# Patient Record
Sex: FEMALE | Race: WHITE
[De-identification: names, ages, dates, MRNs, and addresses within clinical notes are randomized per-mention and may not be internally consistent; named-entity substitution may affect disease eponyms.]

---

## 2018-12-03 LAB — INR PPP: 0.93

## 2018-12-03 NOTE — LEVENE H&P
DATE OF ADMISSION:  December 4, 2018 



IDENTIFICATION/CHIEF COMPLAINT

Neelima is a 73-year-old woman with a chief complaint of left knee pain.  



HISTORY OF PRESENT ILLNESS

Patient has a longstanding history of knee arthritis, progressively painful and 

debilitating, refractory to conservative care.  Surgery is indicated to relieve 

symptoms after failure of nonoperative measures. 



PAST MEDICAL HISTORY

Notable for history of:  

1.  DVT in the affected leg.  

2.  Sleep apnea.  

3.  Reactive airway disease.  

4.  Hypertension.  

5.  Diabetes.  

6.  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  

7.  Kidneys injured from a septic episode.  



PAST SURGICAL HISTORY

Notable for:  

1.  Hysterectomy.  

2.  Lumbar surgery.  



ALLERGIES

1.  PENICILLIN and SULFA that caused rash.  

2.  TETRACYCLINE.  

3.  CIPRO.  

4.  ALTACE.  

5.  LEVAQUIN.  



CURRENT MEDICATIONS

1.  Humalog and Lantus adjusted per sugar.  

2.  Imipramine 50 mg p.o. daily.  

3.  Triamcinolone ointment as directed.  

4.  Myrbetriq 50 mg p.o. daily.  

5.  Advair Diskus inhaler.  

6.  Metoprolol 25 mg p.o. daily.  

7.  Atorvastatin 40 mg p.o. daily.  

8.  Montelukast 10 mg p.o. daily.  

9.  Omeprazole 20 mg p.o. daily.



SOCIAL HISTORY

Negative for prior smoking; none presently.  Denies alcohol abuse.  



REVIEW OF SYSTEMS

Negative.



FAMILY HISTORY 

Unremarkable.  



PHYSICAL EXAMINATION

GENERAL:  This is healthy female.    

HEENT:  She is normocephalic, atraumatic.  Extraocular muscles intact.

NECK:  Supple.

LUNGS:  Clear.  

HEART:  Regular.  

ABDOMEN:  Soft.

ORTHOPEDIC:  The left knee has crepitus throughout.  She has an effusion 

present.  Gross stability is good.  Extensor function intact, stiff at end 

range.  



Radiographs demonstrate end-stage knee arthritis.  



ASSESSMENT

Left knee end-stage degenerative joint disease, refractory to conservative care.

 



PLAN 

Per patient request, will proceed with total knee arthroplasty.  Nature of the 

procedure, risks, benefits, and the anticipated rehab course reviewed.  Risks 

include but are not limited to death, major medical or anesthetic complications,

infection, neurovascular injury, blood transfusion, stiffness, scarring, 

fracture, tendon rupture, instability, implant loosening, migration or failure, 

persistent/recurrent pain, need for additional surgery, and other unforeseen.  

She understands and wishes to proceed.  Signed permit was placed in the chart.  

No guarantees were given or implied.  

MTDD

## 2018-12-04 ENCOUNTER — HOSPITAL ENCOUNTER (INPATIENT)
Dept: HOSPITAL 89 - OR | Age: 73
LOS: 2 days | Discharge: HOME HEALTH | DRG: 470 | End: 2018-12-06
Attending: ORTHOPAEDIC SURGERY | Admitting: ORTHOPAEDIC SURGERY
Payer: MEDICARE

## 2018-12-04 VITALS — SYSTOLIC BLOOD PRESSURE: 113 MMHG | DIASTOLIC BLOOD PRESSURE: 55 MMHG

## 2018-12-04 VITALS
BODY MASS INDEX: 43.24 KG/M2 | HEIGHT: 62.5 IN | WEIGHT: 241 LBS | HEIGHT: 62.5 IN | WEIGHT: 241 LBS | BODY MASS INDEX: 43.24 KG/M2

## 2018-12-04 VITALS — DIASTOLIC BLOOD PRESSURE: 63 MMHG | SYSTOLIC BLOOD PRESSURE: 111 MMHG

## 2018-12-04 VITALS — SYSTOLIC BLOOD PRESSURE: 100 MMHG | DIASTOLIC BLOOD PRESSURE: 69 MMHG

## 2018-12-04 VITALS — SYSTOLIC BLOOD PRESSURE: 116 MMHG | DIASTOLIC BLOOD PRESSURE: 58 MMHG

## 2018-12-04 VITALS — SYSTOLIC BLOOD PRESSURE: 117 MMHG | DIASTOLIC BLOOD PRESSURE: 62 MMHG

## 2018-12-04 VITALS — DIASTOLIC BLOOD PRESSURE: 67 MMHG | SYSTOLIC BLOOD PRESSURE: 124 MMHG

## 2018-12-04 VITALS — DIASTOLIC BLOOD PRESSURE: 62 MMHG | SYSTOLIC BLOOD PRESSURE: 123 MMHG

## 2018-12-04 VITALS — SYSTOLIC BLOOD PRESSURE: 116 MMHG | DIASTOLIC BLOOD PRESSURE: 64 MMHG

## 2018-12-04 VITALS — DIASTOLIC BLOOD PRESSURE: 48 MMHG | SYSTOLIC BLOOD PRESSURE: 58 MMHG

## 2018-12-04 VITALS — DIASTOLIC BLOOD PRESSURE: 55 MMHG | SYSTOLIC BLOOD PRESSURE: 113 MMHG

## 2018-12-04 VITALS — DIASTOLIC BLOOD PRESSURE: 61 MMHG | SYSTOLIC BLOOD PRESSURE: 115 MMHG

## 2018-12-04 VITALS — DIASTOLIC BLOOD PRESSURE: 54 MMHG | SYSTOLIC BLOOD PRESSURE: 130 MMHG

## 2018-12-04 VITALS — DIASTOLIC BLOOD PRESSURE: 53 MMHG | SYSTOLIC BLOOD PRESSURE: 105 MMHG

## 2018-12-04 VITALS — DIASTOLIC BLOOD PRESSURE: 92 MMHG | SYSTOLIC BLOOD PRESSURE: 100 MMHG

## 2018-12-04 VITALS — SYSTOLIC BLOOD PRESSURE: 150 MMHG | DIASTOLIC BLOOD PRESSURE: 92 MMHG

## 2018-12-04 DIAGNOSIS — K21.9: ICD-10-CM

## 2018-12-04 DIAGNOSIS — Z79.4: ICD-10-CM

## 2018-12-04 DIAGNOSIS — E87.5: ICD-10-CM

## 2018-12-04 DIAGNOSIS — E11.22: ICD-10-CM

## 2018-12-04 DIAGNOSIS — E78.5: ICD-10-CM

## 2018-12-04 DIAGNOSIS — I10: ICD-10-CM

## 2018-12-04 DIAGNOSIS — I12.9: ICD-10-CM

## 2018-12-04 DIAGNOSIS — G47.33: ICD-10-CM

## 2018-12-04 DIAGNOSIS — M17.12: Primary | ICD-10-CM

## 2018-12-04 DIAGNOSIS — Z88.1: ICD-10-CM

## 2018-12-04 DIAGNOSIS — Z88.0: ICD-10-CM

## 2018-12-04 DIAGNOSIS — N18.9: ICD-10-CM

## 2018-12-04 DIAGNOSIS — N39.0: ICD-10-CM

## 2018-12-04 DIAGNOSIS — Z88.2: ICD-10-CM

## 2018-12-04 DIAGNOSIS — E11.42: ICD-10-CM

## 2018-12-04 DIAGNOSIS — Z86.718: ICD-10-CM

## 2018-12-04 DIAGNOSIS — Z99.81: ICD-10-CM

## 2018-12-04 DIAGNOSIS — Z88.8: ICD-10-CM

## 2018-12-04 DIAGNOSIS — Z90.710: ICD-10-CM

## 2018-12-04 DIAGNOSIS — J45.909: ICD-10-CM

## 2018-12-04 PROCEDURE — 82947 ASSAY GLUCOSE BLOOD QUANT: CPT

## 2018-12-04 PROCEDURE — 84295 ASSAY OF SERUM SODIUM: CPT

## 2018-12-04 PROCEDURE — 82948 REAGENT STRIP/BLOOD GLUCOSE: CPT

## 2018-12-04 PROCEDURE — 82435 ASSAY OF BLOOD CHLORIDE: CPT

## 2018-12-04 PROCEDURE — 86850 RBC ANTIBODY SCREEN: CPT

## 2018-12-04 PROCEDURE — 86900 BLOOD TYPING SEROLOGIC ABO: CPT

## 2018-12-04 PROCEDURE — 76942 ECHO GUIDE FOR BIOPSY: CPT

## 2018-12-04 PROCEDURE — 36415 COLL VENOUS BLD VENIPUNCTURE: CPT

## 2018-12-04 PROCEDURE — 85610 PROTHROMBIN TIME: CPT

## 2018-12-04 PROCEDURE — 84520 ASSAY OF UREA NITROGEN: CPT

## 2018-12-04 PROCEDURE — 86901 BLOOD TYPING SEROLOGIC RH(D): CPT

## 2018-12-04 PROCEDURE — 36416 COLLJ CAPILLARY BLOOD SPEC: CPT

## 2018-12-04 PROCEDURE — 84132 ASSAY OF SERUM POTASSIUM: CPT

## 2018-12-04 PROCEDURE — 94660 CPAP INITIATION&MGMT: CPT

## 2018-12-04 PROCEDURE — 5A09357 ASSISTANCE WITH RESPIRATORY VENTILATION, LESS THAN 24 CONSECUTIVE HOURS, CONTINUOUS POSITIVE AIRWAY PRESSURE: ICD-10-PCS | Performed by: ORTHOPAEDIC SURGERY

## 2018-12-04 PROCEDURE — 0SRD0J9 REPLACEMENT OF LEFT KNEE JOINT WITH SYNTHETIC SUBSTITUTE, CEMENTED, OPEN APPROACH: ICD-10-PCS | Performed by: ORTHOPAEDIC SURGERY

## 2018-12-04 PROCEDURE — 82310 ASSAY OF CALCIUM: CPT

## 2018-12-04 PROCEDURE — 82565 ASSAY OF CREATININE: CPT

## 2018-12-04 PROCEDURE — 82374 ASSAY BLOOD CARBON DIOXIDE: CPT

## 2018-12-04 PROCEDURE — 93005 ELECTROCARDIOGRAM TRACING: CPT

## 2018-12-04 PROCEDURE — 97161 PT EVAL LOW COMPLEX 20 MIN: CPT

## 2018-12-04 RX ADMIN — MIRABEGRON SCH MG: 25 TABLET, FILM COATED, EXTENDED RELEASE ORAL at 21:54

## 2018-12-04 RX ADMIN — IBUPROFEN SCH MG: 800 TABLET ORAL at 17:25

## 2018-12-04 RX ADMIN — CLINDAMYCIN PHOSPHATE SCH MLS/HR: 18 INJECTION, SOLUTION INTRAVENOUS at 17:14

## 2018-12-04 RX ADMIN — INSULIN GLARGINE SCH UNIT: 100 INJECTION, SOLUTION SUBCUTANEOUS at 21:55

## 2018-12-04 RX ADMIN — METOPROLOL TARTRATE SCH MG: 50 TABLET, FILM COATED ORAL at 21:55

## 2018-12-04 RX ADMIN — INSULIN LISPRO PRN UNIT: 100 INJECTION, SOLUTION INTRAVENOUS; SUBCUTANEOUS at 21:57

## 2018-12-04 RX ADMIN — HYDROCODONE BITARTRATE AND ACETAMINOPHEN PRN EACH: 7.5; 325 TABLET ORAL at 17:25

## 2018-12-04 RX ADMIN — INSULIN LISPRO PRN UNIT: 100 INJECTION, SOLUTION INTRAVENOUS; SUBCUTANEOUS at 17:31

## 2018-12-04 RX ADMIN — AZELASTINE HYDROCHLORIDE AND FLUTICASONE PROPIONATE SCH GM: 137; 50 SPRAY, METERED NASAL at 21:56

## 2018-12-04 RX ADMIN — FLUTICASONE PROPIONATE AND SALMETEROL SCH EACH: 50; 250 POWDER RESPIRATORY (INHALATION) at 18:00

## 2018-12-04 NOTE — HOSPITALIST CONSULTATION
History of Present Illness


Requesting Physician


Dr. Calderón


Reason for Consult


Medical Management


Chief Complaint


s/p left knee replacement


History of Present Illness


 She was admitted s/p left knee replacement. It is reported the surgery went 


well and without complication.





History


Problems:  


(1) GERD (gastroesophageal reflux disease)


Status:  Chronic


(2) Hyperlipidemia


Status:  Chronic


(3) Hypertension


Status:  Chronic


(4) ASTER (obstructive sleep apnea)


Status:  Chronic


(5) Seasonal allergies


Status:  Chronic


(6) Type 2 diabetes mellitus


Status:  Chronic


(7) Asthma


Status:  Chronic


(8) CKD (chronic kidney disease)


Status:  Chronic


(9) Peripheral neuropathy


(10) Chronic UTI


Status:  Chronic


(11) History of DVT (deep vein thrombosis)


Status:  Chronic


Home Meds


Reported Medications


Cyanocobalamin (Vitamin B-12) (B-12) 1,000 Mcg Tablet.er, 1000 MCG PO DAILY


   12/4/18


Cholecalciferol (Vitamin D3) (CHOLECALCIFEROL) 1 Gm Crystals, 3 GM MC DAILY


   12/4/18


Calcitriol (CALCITRIOL) 0.25 Mcg Cap, 0.25 MCG FT DAILY, CAP


   12/4/18


Bumetanide (BUMETANIDE) 0.5 Mg Tablet, 0.5 MG PO DAILY


   12/4/18


Imipramine Hcl (IMIPRAMINE HCL) 10 Mg Tablet, 10 MG PO DAILY


   12/4/18


Insulin Lispro 100 Un/Ml Vial (HUMALOG 100 U/ML VIAL) 100 Unit/1 Ml Vial, SQ 


DAILY PRN for SLIDING SCALE INSULIN, VIAL


   11/30/18


Insulin Glargine 100 Un/Ml Pen (LANTUS SOLOSTAR PEN) 100 Unit/1 Ml Insuln.pen, 


24 UNIT SQ PM, PEN


   11/30/18


Insulin Glargine 100 Un/Ml Pen (LANTUS SOLOSTAR PEN) 100 Unit/1 Ml Insuln.pen, 


14 UNIT SQ AM, PEN


   11/30/18


Imipramine Hcl (IMIPRAMINE HCL) 50 Mg Tablet, 50 MG PO DAILY


   11/30/18


Mirabegron (MYRBETRIQ) 50 Mg Tab.er.24h, 25 MG PO HS


   11/30/18


Azelastine/Fluticasone (DYMISTA NASAL SPRAY) 23 Gm [place].pump, 23 GM NS DAILY


   11/30/18


Cephalexin (KEFLEX) 250 Mg Capsule, 250 MG PO DAILY, #28 CAP


   11/30/18


Calcitriol (CALCITRIOL) 0.25 Mcg Cap, 0.25 MCG PO DAILY, CAP


   11/30/18


Metoprolol Tartrate (METOPROLOL TARTRATE) 25 Mg Tablet, 1 TAB PO BID, TAB


   11/30/18


Fluticasone/Salmeterol (ADVAIR 250-50 DISKUS) 1 Each Disk.w.dev, 1 EACH IH BID


   11/30/18


Olopatadine HCl (Olopatadine HCl) 0.2 % Drops, 1 DROP OU DAILY


   11/30/18


Atorvastatin Calcium (LIPITOR) 40 Mg Tablet, 1 TAB PO QDAY, TAB


   11/30/18


Aspirin (ASPIR 81) 81 Mg Tablet.dr, 81 MG PO QDAY, TAB


   11/30/18


Omeprazole (OMEPRAZOLE) 20 Mg Tablet.dr, 20 MG PO QDAY, TAB


   11/30/18


Discontinued Reported Medications


Triamcinolone Acetonide 0.1% Cr 15 Gm Tube (TRIAMCINOLONE ACETONIDE 0.1% CREAM) 


15 Gm Cream..g., 15 GM TP DAILY PRN for ITCHING, TUBE


   11/30/18


Montelukast Sodium (SINGULAIR) 10 Mg Tablet, 1 TAB PO QDAY, TAB


   11/30/18


Allergies:  


Coded Allergies:  


     Penicillins (Verified  Allergy, Severe, RASH, 11/30/18)


     ciprofloxacin (Verified  Allergy, Severe, TENDON ISSUES, 11/30/18)


     levofloxacin (Verified  Allergy, Severe, TENDON ISSUES (RUPTURE), 11/30/18)


     ramipril (Verified  Allergy, Severe, THROAT SWELLING, 11/30/18)


     tetracycline (Verified  Allergy, Severe, RASH, 11/30/18)


     Sulfa (Sulfonamide Antibiotics) (Verified  Allergy, Unknown, RASH, HIVES, 


11/30/18)


     erythromycin base (Verified  Allergy, Unknown, 12/4/18)


Hx Smoking:  No


Smoking Status:  Never Smoker


Exposure to Second Hand Smoke?:  No


Caffeine/Cups Per Day:  OCCASIONAL


Hx Alcohol Use:  No


Hx Substance Use Disorder:  No


Social Drug Use:  Never


History of IV Drug Use:  No





Review of Systems


All Systems Reviewed/Normal:  Yes, Except as Noted





Exam


Vital Signs





Vital Signs








  Date Time  Temp Pulse Resp B/P (MAP) Pulse Ox O2 Delivery O2 Flow Rate FiO2


 


12/4/18 12:34 97.4 65 16 116/64 (81) 99 Room Air  








General Appearance:  Alert, Awake, No Acute Distress, Afebrile


Neuro:  No Gross deficits


Cardiovascular:  Regular Rate and Rhythm


Respiratory:  No Respiratory Distress, Clear to Auscultation


Psych:  Alert & Oriented X3, Appropriate Mood & Affect





Assessment and Plan


Problems:  


(1) Status post left knee replacement


Status:  Acute


Assessment & Plan:  She will be placed on Xarelto for DVT prophylaxis. She does 


have a history of DVT 2 years ago, spontaneous. 





(2) Type 2 diabetes mellitus


Status:  Chronic


Assessment & Plan:  She is on chronic treatment with Lantus and sliding scale 


insulin. Recently she has had two episodes of hypoglycemia, which have resulted 


in her confused and in the ER. She will get reduced doses of her usual insulin 


and will do AC/ HS blood glucose monitoring. 





(3) Hypertension


Status:  Chronic


Assessment & Plan:  She is on chronic treatment with Metoprolol. This has been 


restarted with hold parameters. 





(4) Asthma


Status:  Chronic


Assessment & Plan:  She is on chronic treatment with Advair inhaler. 





(5) ASTER (obstructive sleep apnea)


Status:  Chronic


Assessment & Plan:  She is on chronic treatment with CPAP. She did bring her own


machine to use during admission. 





(6) CKD (chronic kidney disease)


Status:  Chronic


Assessment & Plan:  Her creatinine was 2.4 prior to surgery. She will get a BMP 


in the morning. 





(7) Hyperlipidemia


Status:  Chronic


Assessment & Plan:  She is on chronic treatment with Atorvastatin. 





(8) Seasonal allergies


Status:  Chronic


Assessment & Plan:  She is on chronic treatment with Dymista Nasal Spray. She 


will use her own medication during admission.





(9) GERD (gastroesophageal reflux disease)


Status:  Chronic


Assessment & Plan:  She is on chronic treatment with Omeprazole. She will get Pr


otonix during admission. 





(10) Chronic UTI


Status:  Chronic


Assessment & Plan:  She is on chronic treatment with Keflex. This will be held 


while she is getting IV antibiotics. 





(11) History of DVT (deep vein thrombosis)


Status:  Chronic


Assessment & Plan:  Spontaneous two years ago. 








Venous Thromboembolism


Antithrombotics


Is Pt On Any Antithrombotics?:  Yes











JIN SINGH             Dec 4, 2018 14:03

## 2018-12-04 NOTE — OPERATIVE REPORT 1
EVENT DATE:  December 4, 2018 

SURGEON:  Jonathan Calderón MD 

ANESTHESIOLOGIST:  Oziel Reza MD 

ANESTHESIA:  General plus adductor canal block.

ASSISTANT:  Tato Aguirre PA-C 





PREOPERATIVE DIAGNOSIS  

Left knee degenerative joint disease.



POSTOPERATIVE DIAGNOSIS 

Left knee degenerative joint disease.



PROCEDURE PERFORMED 

Left total knee arthroplasty.



ESTIMATED BLOOD LOSS 

Minimal.



DRAINS

None.



SPECIMENS 

None.



COMPLICATIONS 

None apparent.



TOURNIQUET TIME 

49 minutes



IMPLANTS USED 

Auctions by [employer] knee system with a 2 left PS femur, a 3 standard tibial 

baseplate, a 31 mm universal, symmetric, all-polyethylene patellar button, and a

13 mm PS tibial tray liner.  Polyethylene is X3.  



INDICATIONS

Neelima is a 73-year-old woman with intractable pain and disability related to 

end-stage knee arthritis.  Surgery is indicated to relieve symptoms after 

failure of nonoperative measures.  



DESCRIPTION OF PROCEDURE 

Patient taken to the operating room.  She is placed supine on the operating 

table.  General anesthesia is induced.  Antibiotics are administered IV along 

with TXA.  The left lower extremity is prepped and draped in the usual sterile 

fashion for orthopedic surgery.  Limb is exsanguinated with an Esmarch bandage. 

Tourniquet is inflated to 300 mmHg.  Midline longitudinal incision made, carried

down through the skin and subcutaneous tissue to the extensor mechanism.  Full-

thickness flaps are developed far enough medially to allow medial parapatellar 

arthrotomy be performed.  Patella is everted.  Knee is brought into flexed 

position.  Fat pad, anterior horns of the menisci, and the cruciate ligaments 

are debrided.  A subperiosteal medial release is initiated in a titrated fashion

to start to balance the knee.  A step drill is used to enter the distal femur.  

A 10-inch long alignment guide is used to engage the isthmus.  Cut set for 6 

degrees of valgus relative to the anatomic axis.  The 10 mm resection block is 

applied, pinned, and cuts made with an oscillating saw.  AP sizing guide is 

applied to the distal femoral cut, positioned for 3 degrees of external rotation

relative to the posterior condyles.  Size 2 is optimal without risk of notching.

 The four-in-one cutting block is applied.  The anterior, posterior, posterior 

chamfer, and anterior chamfer cuts are made respectively.  PS block is applied 

and centered.  Medial and lateral bone is removed from the box.  Trial femur has

nice fit.  Attention is turned to tibial preparation.  The extramedullary guide 

is applied, positioned for varus, valgus, posterior slope, and rotation.  This 

is set to resect 9 mm from the relatively intact lateral tibial plateau.  It is 

dropped down another millimeter or two to ensure an adequate cut.  Block is 

pinned.  Extramedullary alignment check is made and the cuts made with an 

oscillating saw.  Gaps are balanced and symmetric with no additional releases 

required.  The tibial baseplate is inserted along with the trial liner and trial

femur.  Knee is brought to extension.  Patella is taken from the starting 

thickness of 21 to a residual of 14 with a patellar clamp and oscillating saw.  

The 31 provides optimum bony coverage without soft tissue overhang.  Lug holes 

are drilled.  It tends to track laterally, and after checking rotational 

alignment of the femoral and tibial components, I performed an inside-out 

lateral release to improve patellar tracking.  Final tibial preparation consists

of assuring appropriate rotational and translational positioning of the 

component.  Boss is reamed.  Fin is punched.  Surfaces are lavaged.  A mix of 

methacrylate is made and components cemented in a single stage.  Once the cement

is fully polymerized, tourniquet is deflated, and hemostasis is assured.  Wound 

is copiously lavaged to remove all lose debris.  The 13 PS tibial tray liner 

fills up the gap ideally, allowing the knee to drop to full extension without 

hyperextension, providing optimal soft tissue tension and stability.  The tray 

is lavaged and dried.  The liner is locked into the baseplate.  Joint is 

reduced.  Arthrotomy is closed in flexion with #2 Ethibond with vancomycin 

powder deep in the wound.  The derm is closed with 3-0 Vicryl, skin with 

surgical staples.  Xeroform 4 x 4's are applied as a dry, sterile dressing and 

compression wrap.  The patient awakened from anesthesia and taken to the 

recovery room in stable condition having tolerated the procedure well.  



PLAN

Plan is for standard TKA rehab protocol.  

[facility]

## 2018-12-04 NOTE — RADIOLOGY IMAGING REPORT
FACILITY: [facility] 

 

PATIENT NAME: Neelima Joseph

: 1945

MR: [id number]

V: [id number]

EXAM DATE: 

ORDERING PHYSICIAN: HOWIE WILCOX

TECHNOLOGIST: 

 

Location: [facility]

Patient: Neelima Joseph

: 1945

MRN: [id number]

Visit/Account:[id number]

Date of Sevice: 2018

 

ACCESSION #: [id number]

 

Exam type: KNEE LIMITED LEFT

 

History: POST OP

 

Comparison: None.

 

Findings:

 

AP and lateral views the left knee demonstrate a left knee arthroplasty in good anatomic alignment.  
Soft tissue gas and skin staples project over the anterior aspect of the postoperative knee

 

IMPRESSION:

 

1.  As above

 

Report Dictated By: Gaby Morrow MD at 2018 11:53 AM

 

Report E-Signed By: Gaby Morrow MD  at 2018 11:56 AM

 

WSN:[id number]

## 2018-12-05 VITALS — SYSTOLIC BLOOD PRESSURE: 133 MMHG | DIASTOLIC BLOOD PRESSURE: 60 MMHG

## 2018-12-05 VITALS — DIASTOLIC BLOOD PRESSURE: 56 MMHG | SYSTOLIC BLOOD PRESSURE: 134 MMHG

## 2018-12-05 VITALS — SYSTOLIC BLOOD PRESSURE: 113 MMHG | DIASTOLIC BLOOD PRESSURE: 45 MMHG

## 2018-12-05 VITALS — DIASTOLIC BLOOD PRESSURE: 56 MMHG | SYSTOLIC BLOOD PRESSURE: 126 MMHG

## 2018-12-05 VITALS — DIASTOLIC BLOOD PRESSURE: 70 MMHG | SYSTOLIC BLOOD PRESSURE: 134 MMHG

## 2018-12-05 VITALS — DIASTOLIC BLOOD PRESSURE: 62 MMHG | SYSTOLIC BLOOD PRESSURE: 123 MMHG

## 2018-12-05 RX ADMIN — AZELASTINE HYDROCHLORIDE AND FLUTICASONE PROPIONATE SCH GM: 137; 50 SPRAY, METERED NASAL at 21:23

## 2018-12-05 RX ADMIN — PANTOPRAZOLE SODIUM SCH MG: 20 TABLET, DELAYED RELEASE ORAL at 09:23

## 2018-12-05 RX ADMIN — AZELASTINE HYDROCHLORIDE AND FLUTICASONE PROPIONATE SCH GM: 137; 50 SPRAY, METERED NASAL at 09:26

## 2018-12-05 RX ADMIN — CLINDAMYCIN PHOSPHATE SCH MLS/HR: 18 INJECTION, SOLUTION INTRAVENOUS at 01:41

## 2018-12-05 RX ADMIN — INSULIN LISPRO PRN UNIT: 100 INJECTION, SOLUTION INTRAVENOUS; SUBCUTANEOUS at 21:25

## 2018-12-05 RX ADMIN — INSULIN LISPRO PRN UNIT: 100 INJECTION, SOLUTION INTRAVENOUS; SUBCUTANEOUS at 12:03

## 2018-12-05 RX ADMIN — FLUTICASONE PROPIONATE AND SALMETEROL SCH EACH: 50; 250 POWDER RESPIRATORY (INHALATION) at 17:28

## 2018-12-05 RX ADMIN — OLOPATADINE HYDROCHLORIDE SCH DROP: 1 SOLUTION/ DROPS OPHTHALMIC at 09:23

## 2018-12-05 RX ADMIN — METOPROLOL TARTRATE SCH MG: 50 TABLET, FILM COATED ORAL at 21:22

## 2018-12-05 RX ADMIN — HYDROCODONE BITARTRATE AND ACETAMINOPHEN PRN EACH: 7.5; 325 TABLET ORAL at 13:22

## 2018-12-05 RX ADMIN — HYDROCODONE BITARTRATE AND ACETAMINOPHEN PRN EACH: 7.5; 325 TABLET ORAL at 01:46

## 2018-12-05 RX ADMIN — ATORVASTATIN CALCIUM SCH MG: 40 TABLET, FILM COATED ORAL at 09:23

## 2018-12-05 RX ADMIN — HYDROCODONE BITARTRATE AND ACETAMINOPHEN PRN EACH: 7.5; 325 TABLET ORAL at 09:37

## 2018-12-05 RX ADMIN — CLINDAMYCIN PHOSPHATE SCH MLS/HR: 18 INJECTION, SOLUTION INTRAVENOUS at 09:26

## 2018-12-05 RX ADMIN — INSULIN LISPRO PRN UNIT: 100 INJECTION, SOLUTION INTRAVENOUS; SUBCUTANEOUS at 08:03

## 2018-12-05 RX ADMIN — INSULIN LISPRO PRN UNIT: 100 INJECTION, SOLUTION INTRAVENOUS; SUBCUTANEOUS at 17:00

## 2018-12-05 RX ADMIN — INSULIN GLARGINE SCH UNIT: 100 INJECTION, SOLUTION SUBCUTANEOUS at 09:39

## 2018-12-05 RX ADMIN — METOPROLOL TARTRATE SCH MG: 50 TABLET, FILM COATED ORAL at 09:00

## 2018-12-05 RX ADMIN — FLUTICASONE PROPIONATE AND SALMETEROL SCH EACH: 50; 250 POWDER RESPIRATORY (INHALATION) at 05:42

## 2018-12-05 RX ADMIN — IBUPROFEN SCH MG: 800 TABLET ORAL at 01:41

## 2018-12-05 RX ADMIN — RIVAROXABAN SCH MG: 10 TABLET, FILM COATED ORAL at 09:23

## 2018-12-05 RX ADMIN — MIRABEGRON SCH MG: 25 TABLET, FILM COATED, EXTENDED RELEASE ORAL at 21:22

## 2018-12-05 RX ADMIN — INSULIN GLARGINE SCH UNIT: 100 INJECTION, SOLUTION SUBCUTANEOUS at 21:23

## 2018-12-05 NOTE — EKG
FACILITY: [facility] 

 

PATIENT NAME: ARUN VALVERDE

: [id number]

MR: [id number]

V: [id number]

EXAM DATE: 

ORDERING PHYSICIAN: JIN SINGH

TECHNOLOGIST: 

 

Test Reason : LOW BP

Blood Pressure : ***/*** mmHG

Vent. Rate : 075 BPM     Atrial Rate : 075 BPM

   P-R Int : 186 ms          QRS Dur : 098 ms

    QT Int : 382 ms       P-R-T Axes : 034 002 050 degrees

   QTc Int : 426 ms

 

Sinus rhythm

Borderline left axis

Decreased R wave progression anteriorly

No previous ECGs available

Confirmed by CORRINA DAVIES ([id number]) on 2018 3:59:19 PM

 

Referred By:  JERI           Confirmed By:CORRINA DAVIES

## 2018-12-05 NOTE — HOSPITALIST PROGRESS NOTE
Subjective


Progress Notes


Subjective


She was noted to have hyperkalemia on her labs this morning. She denies any 


symptoms or problems this morning.





Patient Complains of:


Cardiovascular:  No: Chest Pain


Respiratory:  No: Shortness of Breath





Physical Exam





Vital Signs








  Date Time  Temp Pulse Resp B/P (MAP) Pulse Ox O2 Delivery O2 Flow Rate FiO2


 


12/5/18 06:47 96.1 78 16 113/45 (67) 94 Nasal Cannula 2.0 














Intake and Output 


 


 12/5/18





 06:58


 


Intake Total 1850 ml


 


Balance 1850 ml


 


 


 


Intake Oral 200 ml


 


IV Total 1650 ml


 


# Voids 3








General Appearance:  Alert, Awake, No Acute Distress, Afebrile


Neuro:  No Gross deficits


Cardiovascular:  Regular Rate and Rhythm


Respiratory:  No Respiratory Distress, Clear to Auscultation


GI:  Soft and Non-Tender


Psych:  Alert & Oriented X3, Appropriate Mood & Affect


Result Diagram:  


12/5/18 7596








Assessment and Plan


Problems:  


(1) Status post left knee replacement


Status:  Acute


Assessment & Plan:  She will be placed on Xarelto for DVT prophylaxis. She does 


have a history of DVT 2 years ago, spontaneous. 





(2) Type 2 diabetes mellitus


Status:  Chronic


Assessment & Plan:  She is on chronic treatment with Lantus and sliding scale 


insulin. Recently she has had two episodes of hypoglycemia, which have resulted 


in her confused and in the ER. She will resume her doses of her usual long a


cting insulin and will do AC/ HS blood glucose monitoring. She will increase to 


sliding scale insulin #3 today secondary to high blood glucoses. 





(3) Hypertension


Status:  Chronic


Assessment & Plan:  She is on chronic treatment with Metoprolol. This has been 


restarted with hold parameters. 





(4) Asthma


Status:  Chronic


Assessment & Plan:  She is on chronic treatment with Advair inhaler. 





(5) ASTER (obstructive sleep apnea)


Status:  Chronic


Assessment & Plan:  She is on chronic treatment with CPAP. She did bring her own


machine to use during admission. 





(6) CKD (chronic kidney disease)


Status:  Chronic


Assessment & Plan:  Her creatinine was 2.4 prior to surgery. She will get a BMP 


in the morning. 





(7) Hyperlipidemia


Status:  Chronic


Assessment & Plan:  She is on chronic treatment with Atorvastatin. 





(8) Seasonal allergies


Status:  Chronic


Assessment & Plan:  She is on chronic treatment with Dymista Nasal Spray. She 


will use her own medication during admission.





(9) GERD (gastroesophageal reflux disease)


Status:  Chronic


Assessment & Plan:  She is on chronic treatment with Omeprazole. She will get 


Protonix during admission. 





(10) Chronic UTI


Status:  Chronic


Assessment & Plan:  She is on chronic treatment with Keflex. This will be held 


while she is getting IV antibiotics. 





(11) History of DVT (deep vein thrombosis)


Status:  Chronic


Assessment & Plan:  Spontaneous two years ago. 





(12) Hyperkalemia


Assessment & Plan:  Likely secondary to CKD. EKG performed shows normal rhythm 


and T waves. She will get repeat BMP this afternoon. 








Exam


Sepsis Risk:  No Definite Risk











JIN SINGHP             Dec 5, 2018 12:52

## 2018-12-06 VITALS — SYSTOLIC BLOOD PRESSURE: 167 MMHG | DIASTOLIC BLOOD PRESSURE: 69 MMHG

## 2018-12-06 VITALS — SYSTOLIC BLOOD PRESSURE: 158 MMHG | DIASTOLIC BLOOD PRESSURE: 69 MMHG

## 2018-12-06 RX ADMIN — HYDROCODONE BITARTRATE AND ACETAMINOPHEN PRN EACH: 7.5; 325 TABLET ORAL at 14:29

## 2018-12-06 RX ADMIN — FLUTICASONE PROPIONATE AND SALMETEROL SCH EACH: 50; 250 POWDER RESPIRATORY (INHALATION) at 06:08

## 2018-12-06 RX ADMIN — AZELASTINE HYDROCHLORIDE AND FLUTICASONE PROPIONATE SCH GM: 137; 50 SPRAY, METERED NASAL at 09:00

## 2018-12-06 RX ADMIN — INSULIN LISPRO PRN UNIT: 100 INJECTION, SOLUTION INTRAVENOUS; SUBCUTANEOUS at 12:17

## 2018-12-06 RX ADMIN — PANTOPRAZOLE SODIUM SCH MG: 20 TABLET, DELAYED RELEASE ORAL at 09:26

## 2018-12-06 RX ADMIN — INSULIN LISPRO PRN UNIT: 100 INJECTION, SOLUTION INTRAVENOUS; SUBCUTANEOUS at 08:14

## 2018-12-06 RX ADMIN — METOPROLOL TARTRATE SCH MG: 50 TABLET, FILM COATED ORAL at 09:27

## 2018-12-06 RX ADMIN — OLOPATADINE HYDROCHLORIDE SCH APP: 1 SOLUTION/ DROPS OPHTHALMIC at 09:27

## 2018-12-06 RX ADMIN — HYDROCODONE BITARTRATE AND ACETAMINOPHEN PRN EACH: 7.5; 325 TABLET ORAL at 04:30

## 2018-12-06 RX ADMIN — RIVAROXABAN SCH MG: 10 TABLET, FILM COATED ORAL at 09:27

## 2018-12-06 RX ADMIN — INSULIN GLARGINE SCH UNIT: 100 INJECTION, SOLUTION SUBCUTANEOUS at 09:26

## 2018-12-06 RX ADMIN — ATORVASTATIN CALCIUM SCH MG: 40 TABLET, FILM COATED ORAL at 09:26

## 2018-12-06 NOTE — HOSPITALIST PROGRESS NOTE
Subjective


Progress Notes


Subjective


She has no complaints this morning. She had no acute events overnight.





Patient Complains of:


Cardiovascular:  No: Chest Pain


Respiratory:  No: Shortness of Breath





Physical Exam





Vital Signs








  Date Time  Temp Pulse Resp B/P (MAP) Pulse Ox O2 Delivery O2 Flow Rate FiO2


 


12/6/18 09:39     90 Nasal Cannula 0.5 


 


12/6/18 08:22  93 16 167/69 (101)    


 


12/6/18 03:51 98.7       














Intake and Output 


 


 12/6/18





 06:59


 


Intake Total 1010 ml


 


Balance 1010 ml


 


 


 


Intake Oral 960 ml


 


IV Total 50 ml


 


# Voids 4








General Appearance:  Alert, Awake, No Acute Distress, Afebrile


Cardiovascular:  Regular Rate and Rhythm


Respiratory:  No Respiratory Distress, Clear to Auscultation


Psych:  Alert & Oriented X3, Appropriate Mood & Affect


Result Diagram:  


12/6/18 7047








Assessment and Plan


Problems:  


(1) Status post left knee replacement


Status:  Acute


Assessment & Plan:  She will be placed on Xarelto for DVT prophylaxis. She does 


have a history of DVT 2 years ago, spontaneous. 





(2) Type 2 diabetes mellitus


Status:  Chronic


Assessment & Plan:  She is on chronic treatment with Lantus and sliding scale 


insulin. Recently she has had two episodes of hypoglycemia, which have resulted 


in her confused and in the ER. She will resume her doses of her usual long 


acting insulin and will do AC/ HS blood glucose monitoring. She will increase 


her sliding scale insulin at home, she will follow up with her PCP within one 


week. 





(3) Hypertension


Status:  Chronic


Assessment & Plan:  She is on chronic treatment with Metoprolol. This were 


restarted with hold parameters. 





(4) Asthma


Status:  Chronic


Assessment & Plan:  She is on chronic treatment with Advair inhaler. 





(5) ASTER (obstructive sleep apnea)


Status:  Chronic


Assessment & Plan:  She is on chronic treatment with CPAP. She did bring her own


machine to use during admission. 





(6) CKD (chronic kidney disease)


Status:  Chronic


Assessment & Plan:  Her creatinine was 2.4 prior to surgery. Creatinine 2.1 this


morning. 





(7) Hyperlipidemia


Status:  Chronic


Assessment & Plan:  She is on chronic treatment with Atorvastatin. 





(8) Seasonal allergies


Status:  Chronic


Assessment & Plan:  She is on chronic treatment with Dymista Nasal Spray. She 


will use her own medication during admission.





(9) GERD (gastroesophageal reflux disease)


Status:  Chronic


Assessment & Plan:  She is on chronic treatment with Omeprazole. She will get 


Protonix during admission. 





(10) Chronic UTI


Status:  Chronic


Assessment & Plan:  She is on chronic treatment with Keflex. 





(11) History of DVT (deep vein thrombosis)


Status:  Chronic


Assessment & Plan:  Spontaneous two years ago. 





(12) Hyperkalemia


Assessment & Plan:  Improved. Likely secondary to CKD. EKG performed shows 


normal rhythm and T waves. Potassium 4.9 this morning. 








Exam


Sepsis Risk:  No Definite Risk











JIN SINGH             Dec 6, 2018 11:01